# Patient Record
Sex: FEMALE
[De-identification: names, ages, dates, MRNs, and addresses within clinical notes are randomized per-mention and may not be internally consistent; named-entity substitution may affect disease eponyms.]

---

## 2022-02-01 ENCOUNTER — RESULT REVIEW (OUTPATIENT)
Age: 77
End: 2022-02-01

## 2022-03-02 ENCOUNTER — HOSPITAL ENCOUNTER (EMERGENCY)
Facility: HOSPITAL | Age: 77
Discharge: HOME | End: 2022-03-02
Attending: EMERGENCY MEDICINE
Payer: MEDICARE

## 2022-03-02 ENCOUNTER — APPOINTMENT (EMERGENCY)
Dept: CT IMAGING | Facility: HOSPITAL | Age: 77
End: 2022-03-02
Payer: MEDICARE

## 2022-03-02 VITALS
HEART RATE: 101 BPM | DIASTOLIC BLOOD PRESSURE: 90 MMHG | TEMPERATURE: 98.2 F | RESPIRATION RATE: 18 BRPM | OXYGEN SATURATION: 98 % | SYSTOLIC BLOOD PRESSURE: 164 MMHG

## 2022-03-02 DIAGNOSIS — R11.2 NAUSEA VOMITING AND DIARRHEA: ICD-10-CM

## 2022-03-02 DIAGNOSIS — R10.9 NONSPECIFIC ABDOMINAL PAIN: Primary | ICD-10-CM

## 2022-03-02 DIAGNOSIS — R19.7 NAUSEA VOMITING AND DIARRHEA: ICD-10-CM

## 2022-03-02 LAB
ALBUMIN SERPL BCP-MCNC: 3.8 G/DL (ref 3.5–5)
ALP SERPL-CCNC: 157 U/L (ref 46–116)
ALT SERPL W P-5'-P-CCNC: 45 U/L (ref 12–78)
ANION GAP SERPL CALCULATED.3IONS-SCNC: 9 MMOL/L (ref 4–13)
AST SERPL W P-5'-P-CCNC: 39 U/L (ref 5–45)
BASOPHILS # BLD AUTO: 0.02 THOUSANDS/ΜL (ref 0–0.1)
BASOPHILS NFR BLD AUTO: 0 % (ref 0–1)
BILIRUB DIRECT SERPL-MCNC: 0.41 MG/DL (ref 0–0.2)
BILIRUB SERPL-MCNC: 2.21 MG/DL (ref 0.2–1)
BUN SERPL-MCNC: 24 MG/DL (ref 5–25)
CALCIUM SERPL-MCNC: 8.6 MG/DL (ref 8.3–10.1)
CHLORIDE SERPL-SCNC: 102 MMOL/L (ref 100–108)
CO2 SERPL-SCNC: 26 MMOL/L (ref 21–32)
CREAT SERPL-MCNC: 1.54 MG/DL (ref 0.6–1.3)
EOSINOPHIL # BLD AUTO: 0.11 THOUSAND/ΜL (ref 0–0.61)
EOSINOPHIL NFR BLD AUTO: 1 % (ref 0–6)
ERYTHROCYTE [DISTWIDTH] IN BLOOD BY AUTOMATED COUNT: 14.1 % (ref 11.6–15.1)
GFR SERPL CREATININE-BSD FRML MDRD: 33 ML/MIN/1.73SQ M
GLUCOSE SERPL-MCNC: 156 MG/DL (ref 65–140)
HCT VFR BLD AUTO: 45.1 % (ref 34.8–46.1)
HGB BLD-MCNC: 15.3 G/DL (ref 11.5–15.4)
IMM GRANULOCYTES # BLD AUTO: 0.03 THOUSAND/UL (ref 0–0.2)
IMM GRANULOCYTES NFR BLD AUTO: 0 % (ref 0–2)
LIPASE SERPL-CCNC: 327 U/L (ref 73–393)
LYMPHOCYTES # BLD AUTO: 0.13 THOUSANDS/ΜL (ref 0.6–4.47)
LYMPHOCYTES NFR BLD AUTO: 1 % (ref 14–44)
MCH RBC QN AUTO: 30.2 PG (ref 26.8–34.3)
MCHC RBC AUTO-ENTMCNC: 33.9 G/DL (ref 31.4–37.4)
MCV RBC AUTO: 89 FL (ref 82–98)
MONOCYTES # BLD AUTO: 0.39 THOUSAND/ΜL (ref 0.17–1.22)
MONOCYTES NFR BLD AUTO: 3 % (ref 4–12)
NEUTROPHILS # BLD AUTO: 10.75 THOUSANDS/ΜL (ref 1.85–7.62)
NEUTS SEG NFR BLD AUTO: 95 % (ref 43–75)
NRBC BLD AUTO-RTO: 0 /100 WBCS
PLATELET # BLD AUTO: 142 THOUSANDS/UL (ref 149–390)
PMV BLD AUTO: 10.4 FL (ref 8.9–12.7)
POTASSIUM SERPL-SCNC: 4.6 MMOL/L (ref 3.5–5.3)
PROT SERPL-MCNC: 7.4 G/DL (ref 6.4–8.2)
RBC # BLD AUTO: 5.06 MILLION/UL (ref 3.81–5.12)
SODIUM SERPL-SCNC: 137 MMOL/L (ref 136–145)
WBC # BLD AUTO: 11.43 THOUSAND/UL (ref 4.31–10.16)

## 2022-03-02 PROCEDURE — 99284 EMERGENCY DEPT VISIT MOD MDM: CPT | Performed by: EMERGENCY MEDICINE

## 2022-03-02 PROCEDURE — G1004 CDSM NDSC: HCPCS

## 2022-03-02 PROCEDURE — 96361 HYDRATE IV INFUSION ADD-ON: CPT

## 2022-03-02 PROCEDURE — 85025 COMPLETE CBC W/AUTO DIFF WBC: CPT | Performed by: EMERGENCY MEDICINE

## 2022-03-02 PROCEDURE — 99284 EMERGENCY DEPT VISIT MOD MDM: CPT

## 2022-03-02 PROCEDURE — 36415 COLL VENOUS BLD VENIPUNCTURE: CPT | Performed by: EMERGENCY MEDICINE

## 2022-03-02 PROCEDURE — 96374 THER/PROPH/DIAG INJ IV PUSH: CPT

## 2022-03-02 PROCEDURE — 74176 CT ABD & PELVIS W/O CONTRAST: CPT

## 2022-03-02 PROCEDURE — 80048 BASIC METABOLIC PNL TOTAL CA: CPT | Performed by: EMERGENCY MEDICINE

## 2022-03-02 PROCEDURE — 80076 HEPATIC FUNCTION PANEL: CPT | Performed by: EMERGENCY MEDICINE

## 2022-03-02 PROCEDURE — 83690 ASSAY OF LIPASE: CPT | Performed by: EMERGENCY MEDICINE

## 2022-03-02 RX ORDER — ONDANSETRON 2 MG/ML
4 INJECTION INTRAMUSCULAR; INTRAVENOUS ONCE
Status: COMPLETED | OUTPATIENT
Start: 2022-03-02 | End: 2022-03-02

## 2022-03-02 RX ORDER — ONDANSETRON 4 MG/1
4 TABLET, FILM COATED ORAL EVERY 6 HOURS PRN
Qty: 10 TABLET | Refills: 0 | Status: SHIPPED | OUTPATIENT
Start: 2022-03-02

## 2022-03-02 RX ADMIN — ONDANSETRON 4 MG: 2 INJECTION INTRAMUSCULAR; INTRAVENOUS at 20:43

## 2022-03-02 RX ADMIN — SODIUM CHLORIDE 1000 ML: 0.9 INJECTION, SOLUTION INTRAVENOUS at 20:43

## 2022-03-03 NOTE — ED PROVIDER NOTES
History  Chief Complaint   Patient presents with    Diarrhea     patient c/o vomiting, diarrhea since this afternoon  hx of diverticulitis  HPI patient is a 81-year-old female, visiting the area, reports that she developed after eating lunch sudden onset of diarrhea associated now with vomiting  Patient reports some lower abdominal pain, somewhat like a cramping  She reports that she had a recent colonoscopy within the last 3 months and was told she had diverticulitis that was mild  She denies any fever or chills  Denies any trauma to her abdomen  Denies any back pain  There are no urinary symptoms  Patient reports that she threw up in the waiting room  Patient denies any rash  She reports history of cholecystectomy but denies any recent abdominal surgery  Past medical history of diverticulitis, gastric reflux hypertension yet cholecystectomy nephrectomy  Family history noncontributory  Social history visiting the area, nonsmoker    None       Past Medical History:   Diagnosis Date    Diverticulitis     GERD (gastroesophageal reflux disease)     Hypertension        Past Surgical History:   Procedure Laterality Date     SECTION      CHOLECYSTECTOMY      NEPHRECTOMY      REPLACEMENT TOTAL KNEE Bilateral        No family history on file  I have reviewed and agree with the history as documented  E-Cigarette/Vaping     E-Cigarette/Vaping Substances     Social History     Tobacco Use    Smoking status: Never Smoker    Smokeless tobacco: Never Used   Substance Use Topics    Alcohol use: Never    Drug use: Never       Review of Systems   Constitutional: Negative for diaphoresis, fatigue and fever  HENT: Negative for congestion, ear pain, nosebleeds and sore throat  Eyes: Negative for photophobia, pain, discharge and visual disturbance  Respiratory: Negative for cough, choking, chest tightness, shortness of breath and wheezing      Cardiovascular: Negative for chest pain and palpitations  Gastrointestinal: Positive for abdominal pain, diarrhea and vomiting  Negative for abdominal distention  Genitourinary: Negative for dysuria, flank pain and frequency  Musculoskeletal: Negative for back pain, gait problem and joint swelling  Skin: Negative for color change and rash  Neurological: Negative for dizziness, syncope and headaches  Psychiatric/Behavioral: Negative for behavioral problems and confusion  The patient is not nervous/anxious  All other systems reviewed and are negative  Physical Exam  Physical Exam  Vitals and nursing note reviewed  Constitutional:       Appearance: She is well-developed  HENT:      Head: Normocephalic  Right Ear: External ear normal       Left Ear: External ear normal       Nose: Nose normal    Eyes:      General: Lids are normal       Pupils: Pupils are equal, round, and reactive to light  Cardiovascular:      Rate and Rhythm: Normal rate and regular rhythm  Pulses: Normal pulses  Heart sounds: Normal heart sounds  Pulmonary:      Effort: Pulmonary effort is normal  No respiratory distress  Breath sounds: Normal breath sounds  Abdominal:      General: Abdomen is flat  Bowel sounds are normal       Tenderness: There is abdominal tenderness  Comments: There is mild lower abdominal tenderness without rebound or guarding   Musculoskeletal:         General: No deformity  Normal range of motion  Cervical back: Normal range of motion and neck supple  Skin:     General: Skin is warm and dry  Neurological:      Mental Status: She is alert and oriented to person, place, and time           Vital Signs  ED Triage Vitals [03/02/22 2030]   Temperature Pulse Respirations Blood Pressure SpO2   98 2 °F (36 8 °C) 101 18 164/90 98 %      Temp src Heart Rate Source Patient Position - Orthostatic VS BP Location FiO2 (%)   -- Monitor -- -- --      Pain Score       --           Vitals:    03/02/22 2030   BP: 164/90 Pulse: 101         Visual Acuity      ED Medications  Medications   sodium chloride 0 9 % bolus 1,000 mL (0 mL Intravenous Stopped 3/2/22 2234)   ondansetron (ZOFRAN) injection 4 mg (4 mg Intravenous Given 3/2/22 2043)       Diagnostic Studies  Results Reviewed     Procedure Component Value Units Date/Time    CBC and differential [[id number]]  (Abnormal) Collected: 03/02/22 2042    Lab Status: Final result Specimen: Blood from Arm, Right Updated: 03/02/22 2120     WBC 11 43 Thousand/uL      RBC 5 06 Million/uL      Hemoglobin 15 3 g/dL      Hematocrit 45 1 %      MCV 89 fL      MCH 30 2 pg      MCHC 33 9 g/dL      RDW 14 1 %      MPV 10 4 fL      Platelets 587 Thousands/uL      nRBC 0 /100 WBCs      Neutrophils Relative 95 %      Immat GRANS % 0 %      Lymphocytes Relative 1 %      Monocytes Relative 3 %      Eosinophils Relative 1 %      Basophils Relative 0 %      Neutrophils Absolute 10 75 Thousands/µL      Immature Grans Absolute 0 03 Thousand/uL      Lymphocytes Absolute 0 13 Thousands/µL      Monocytes Absolute 0 39 Thousand/µL      Eosinophils Absolute 0 11 Thousand/µL      Basophils Absolute 0 02 Thousands/µL     Narrative:      No Clots    Hepatic function panel [[id number]]  (Abnormal) Collected: 03/02/22 2042    Lab Status: Final result Specimen: Blood from Arm, Right Updated: 03/02/22 2109     Total Bilirubin 2 21 mg/dL      Bilirubin, Direct 0 41 mg/dL      Alkaline Phosphatase 157 U/L      AST 39 U/L      ALT 45 U/L      Total Protein 7 4 g/dL      Albumin 3 8 g/dL     Lipase [[id number]]  (Normal) Collected: 03/02/22 2042    Lab Status: Final result Specimen: Blood from Arm, Right Updated: 03/02/22 2109     Lipase 327 u/L     Basic metabolic panel [[id number]]  (Abnormal) Collected: 03/02/22 2042    Lab Status: Final result Specimen: Blood from Arm, Right Updated: 03/02/22 2109     Sodium 137 mmol/L      Potassium 4 6 mmol/L      Chloride 102 mmol/L      CO2 26 mmol/L      ANION GAP 9 mmol/L      BUN 24 mg/dL      Creatinine 1 54 mg/dL      Glucose 156 mg/dL      Calcium 8 6 mg/dL      eGFR 33 ml/min/1 73sq m     Narrative:      [place] guidelines for Chronic Kidney Disease (CKD):     Stage 1 with normal or high GFR (GFR > 90 mL/min/1 73 square meters)    Stage 2 Mild CKD (GFR = 60-89 mL/min/1 73 square meters)    Stage 3A Moderate CKD (GFR = 45-59 mL/min/1 73 square meters)    Stage 3B Moderate CKD (GFR = 30-44 mL/min/1 73 square meters)    Stage 4 Severe CKD (GFR = 15-29 mL/min/1 73 square meters)    Stage 5 End Stage CKD (GFR <15 mL/min/1 73 square meters)  Note: GFR calculation is accurate only with a steady state creatinine                 CT abdomen pelvis wo contrast   Final Result by Cristiano Moser MD (03/02 2211)      No acute intra-abdominal or pelvic process  Workstation performed: [id number]                    Procedures  Procedures         ED Course        diagnostic testing:  White count was elevated 11 4 nonspecific finding, hemoglobin 15 no sign of anemia  Liver function showed minimal elevations in bilirubin,  Electrolytes showed a normal BUN 24 creatinine 1 54 consistent with some renal insufficiency patient has 1 kidney         patient reports markedly improved after hydration and Zofran  No further vomiting               CT scan abdomen pelvis showed no acute pathology  SBIRT 22yo+      Most Recent Value   SBIRT (24 yo +)    In order to provide better care to our patients, we are screening all of our patients for alcohol and drug use  Would it be okay to ask you these screening questions? No Filed at: 03/02/2022 3290                    [facility] medical decision making 79-year-old female presents emergency department with diffuse abdominal soreness nausea vomiting diarrhea, patient reports started after lunch  Patient proved with Zofran and IV fluids here  CT showed no acute pathology  Electrolytes within normal limits    Discussed outpatient treatment follow-up discussed indications to return  Disposition  Final diagnoses:   Nonspecific abdominal pain   Nausea vomiting and diarrhea     Time reflects when diagnosis was documented in both MDM as applicable and the Disposition within this note     Time User Action Codes Description Comment    3/2/2022 10:22 PM Linda Mireles Add [R10 9] Nonspecific abdominal pain     3/2/2022 10:22 PM Linda Mireles Add [R11 2,  R19 7] Nausea vomiting and diarrhea       ED Disposition     ED Disposition Condition Date/Time Comment    Discharge Stable Wed Mar 2, 2022 10:22 PM [place] Every discharge to home/self care  Follow-up Information    None         Patient's Medications   Discharge Prescriptions    ONDANSETRON (ZOFRAN) 4 MG TABLET    Take 1 tablet (4 mg total) by mouth every 6 (six) hours as needed for nausea or vomiting for up to 10 doses       Start Date: 3/2/2022  End Date: --       Order Dose: 4 mg       Quantity: 10 tablet    Refills: 0       No discharge procedures on file      PDMP Review     None          ED Provider  Electronically Signed by           Darrell Feldman MD  03/02/22 6670

## 2022-03-03 NOTE — DISCHARGE INSTRUCTIONS
[place] diet and slowly advance  Zofran every 6 hours if needed for nausea vomiting  Increase liquids  Follow up with your doctor or return worsening symptoms or any problems

## 2024-05-27 ENCOUNTER — APPOINTMENT (OUTPATIENT)
Dept: ORTHOPEDIC SURGERY | Facility: CLINIC | Age: 79
End: 2024-05-27
Payer: MEDICARE

## 2024-05-27 VITALS — HEIGHT: 63 IN | BODY MASS INDEX: 35.44 KG/M2 | WEIGHT: 200 LBS

## 2024-05-27 DIAGNOSIS — S63.619A UNSPECIFIED SPRAIN OF UNSPECIFIED FINGER, INITIAL ENCOUNTER: ICD-10-CM

## 2024-05-27 DIAGNOSIS — I10 ESSENTIAL (PRIMARY) HYPERTENSION: ICD-10-CM

## 2024-05-27 PROCEDURE — 99203 OFFICE O/P NEW LOW 30 MIN: CPT

## 2024-05-27 PROCEDURE — 73130 X-RAY EXAM OF HAND: CPT | Mod: RT

## 2024-05-27 NOTE — ASSESSMENT
[FreeTextEntry1] : A/P R 4th finger sprain - maximus tape - NWB - ice/elevation - NSAIDs prn - f/u 1 week with hand

## 2024-05-27 NOTE — HISTORY OF PRESENT ILLNESS
[6] : 6 [Dull/Aching] : dull/aching [Localized] : localized [Constant] : constant [Retired] : Work status: retired [de-identified] : 77 y/o LHD F with R hand pain after fall yesterday. Here today with continued pain and swelling. denies numbness/tingling. [] : Post Surgical Visit: no [FreeTextEntry1] : Right hand [FreeTextEntry3] : 5/26/24 [FreeTextEntry5] : Patient tripped and fell down while carrying a laundry basket yesterday at home, hit her right hand 5/26/24. H/O arthritis in both hands. [de-identified] : activity

## 2024-05-27 NOTE — IMAGING
[de-identified] : PE R hand: +swelling over 4th MCP, skin intact, +tender to 4th MC head and proximal phalanx, no wrist tenderness, no tenderness to snuffbox, no rotational deformity of fingers, AIN/PIN/U intact, sensory intact, 2+radial pulse, able to make loose fist. forearm soft, NT. [Right] : right hand [There are no fractures, subluxations or dislocations. No significant abnormalities are seen] : There are no fractures, subluxations or dislocations. No significant abnormalities are seen

## 2024-06-06 ENCOUNTER — APPOINTMENT (OUTPATIENT)
Dept: ORTHOPEDIC SURGERY | Facility: CLINIC | Age: 79
End: 2024-06-06